# Patient Record
Sex: MALE | Race: BLACK OR AFRICAN AMERICAN
[De-identification: names, ages, dates, MRNs, and addresses within clinical notes are randomized per-mention and may not be internally consistent; named-entity substitution may affect disease eponyms.]

---

## 2018-11-05 ENCOUNTER — HOSPITAL ENCOUNTER (EMERGENCY)
Dept: HOSPITAL 74 - JERFT | Age: 23
Discharge: HOME | End: 2018-11-05
Payer: COMMERCIAL

## 2018-11-05 VITALS — HEART RATE: 83 BPM | TEMPERATURE: 97.9 F | SYSTOLIC BLOOD PRESSURE: 142 MMHG | DIASTOLIC BLOOD PRESSURE: 99 MMHG

## 2018-11-05 VITALS — BODY MASS INDEX: 28 KG/M2

## 2018-11-05 DIAGNOSIS — R06.02: Primary | ICD-10-CM

## 2018-11-05 DIAGNOSIS — F17.210: ICD-10-CM

## 2018-11-05 NOTE — PDOC
History of Present Illness





- General


Chief Complaint: Shortness of Breath


Stated Complaint: Shortness of Breath


Time Seen by Provider: 11/05/18 18:28





- History of Present Illness


Initial Comments: 





11/05/18 18:45


22-year-old male without comorbidities presents for evaluation of shortness of 

breath. He states he was [place] train station [place] became short 

of breath when his car for about 20 minutes and was still short of breath this 

symptoms have now resolved he has no other associated symptoms.





Past History





- Past Medical History


Allergies/Adverse Reactions: 


 Allergies











Allergy/AdvReac Type Severity Reaction Status Date / Time


 


No Known Allergies Allergy   Verified 11/05/18 18:29











Home Medications: 


Ambulatory Orders





NK [No Known Home Medication]  11/05/18 











- Suicide/Smoking/Psychosocial Hx


Smoking History: Current some day smoker


Information on smoking cessation initiated: No





**Review of Systems





- Review of Systems


Respiratory: Yes: Shortness of Breath





*Physical Exam





- Vital Signs


 Last Vital Signs











Temp Pulse Resp BP Pulse Ox


 


 97.9 F   83   18   142/99   99 


 


 11/05/18 18:29  11/05/18 18:29  11/05/18 18:29  11/05/18 18:29  11/05/18 18:29














- Physical Exam


Comments: 





11/05/18 18:45


HEAD: NC/AT


EYES: Conjuntiva clear


Ears: Canals and TM's normal


NOSE: No d/c


THROAT: Moist mucous membrances, oral pharanx clear, uvula midline


NECK: Supple without adenopathy


CARDIAC: S1 S2


LUNGS: CTA Full and Equal breath sounds


ABDOMEN: Soft NT ND


MS: Full ROM in all joints without edema 


NEUROLOGIC: No gross sensory or motor deficits, NVID


SKIN: Normal color and temperature no lesions or rashes





Medical Decision Making





- Medical Decision Making


Chest x-ray normal, patient stable not conversationally dyspneic no shortness 

of breath now follow-up with his primary care physician with instructions to 

return to the emergency room should symptoms come back


11/05/18 18:45








*DC/Admit/Observation/Transfer


Diagnosis at time of Disposition: 


 Shortness of breath








- Discharge Dispostion


Disposition: HOME


Condition at time of disposition: Stable


Decision to Admit order: No





- Referrals


Referrals: 


Maria Eugenia Poe MD [Staff Physician] - 


Truong Block MD [Non Staff, Medical] - 


Michael Olmstead MD [Non Staff, Medical] - 


Ivan Jarvis MD [Non Staff, Medical] - 


Michael Huber MD [Non Staff, Medical] - 


Junay Muniz MD [Non Staff, Medical] - 





- Patient Instructions


Printed Discharge Instructions:  DI for Shortness of Breath, How to Manage 

Shortness of Breath


Additional Instructions: 


Return to the emergency room should symptoms return otherwise follow-up with 

her primary care physician once 2 days for further evaluation and treatment 

options. No treatment is necessary at this time.





- Post Discharge Activity

## 2018-11-05 NOTE — PDOC
Rapid Medical Evaluation


Chief Complaint: Respiratory


Time Seen by Provider: 11/05/18 18:28


Medical Evaluation: 





11/05/18 18:28


I have performed a brief in-person evaluation of this patient. 


The patient presents with a chief complaint of: was walking home , up hill  and 

had acute onset of SOB. No palp, diaphoresis, No cough, fevers/ no recent URI. 

No hx of same. STate still haqs some SOB. 


Pertinent physical exam findings: Clear, no wheezing , talks in full sentences, 


I have ordered the following: CXR


The patient will proceed to the ED for further evaluation